# Patient Record
Sex: MALE | Race: OTHER | NOT HISPANIC OR LATINO | URBAN - METROPOLITAN AREA
[De-identification: names, ages, dates, MRNs, and addresses within clinical notes are randomized per-mention and may not be internally consistent; named-entity substitution may affect disease eponyms.]

---

## 2018-03-19 ENCOUNTER — EMERGENCY (EMERGENCY)
Facility: HOSPITAL | Age: 28
LOS: 1 days | Discharge: HOME | End: 2018-03-19
Attending: EMERGENCY MEDICINE
Payer: SELF-PAY

## 2018-03-19 VITALS
WEIGHT: 139.99 LBS | HEART RATE: 87 BPM | DIASTOLIC BLOOD PRESSURE: 61 MMHG | HEIGHT: 70 IN | TEMPERATURE: 98 F | SYSTOLIC BLOOD PRESSURE: 128 MMHG | OXYGEN SATURATION: 100 % | RESPIRATION RATE: 16 BRPM

## 2018-03-19 PROCEDURE — 99284 EMERGENCY DEPT VISIT MOD MDM: CPT

## 2018-03-19 PROCEDURE — 99283 EMERGENCY DEPT VISIT LOW MDM: CPT

## 2018-03-19 RX ORDER — ACETAMINOPHEN 500 MG
650 TABLET ORAL ONCE
Qty: 0 | Refills: 0 | Status: COMPLETED | OUTPATIENT
Start: 2018-03-19 | End: 2018-03-19

## 2018-03-19 RX ADMIN — Medication 650 MILLIGRAM(S): at 20:50

## 2018-03-19 RX ADMIN — Medication 650 MILLIGRAM(S): at 20:20
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
## 2018-03-19 NOTE — ED PROVIDER NOTE - PROGRESS NOTE DETAILS
Patient feeling much better, no tenderness to facial bones, no indication now for CT, patient feels it's not necessary, will DC to home.
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
## 2023-10-04 NOTE — ED ADULT NURSE NOTE - AGGRAVATING FACTORS
10/4/23, 10:50 AM EDT    Patient goals/plan/ treatment preferences discussed by  and . Patient goals/plan/ treatment preferences reviewed with patient/ family. Patient/ family verbalize understanding of discharge plan and are in agreement with goal/plan/treatment preferences. Understanding was demonstrated using the teach back method. AVS provided by RN at time of discharge, which includes all necessary medical information pertaining to the patients current course of illness, treatment, post-discharge goals of care, and treatment preferences. Services At/After Discharge: [address] [place] [address] (SNF), Aide services, In ambulance, Nursing service, OT, and PT       IMM Letter  IMM Letter given to Patient/Family/Significant other/Guardian/POA/by[de-identified] last PARMAR  IMM Letter date given[de-identified] 10/04/23  IMM Letter time given[de-identified] 26     SW received call from Graciela Cho with [facility] that insurance has approved admission today. SHABNAM updated pt, spouse and attending. LACP set for  at 1 pm.  SHABNAM will fax AVS when completed. JENNIFER Ramey is aware. none